# Patient Record
Sex: FEMALE | Race: WHITE
[De-identification: names, ages, dates, MRNs, and addresses within clinical notes are randomized per-mention and may not be internally consistent; named-entity substitution may affect disease eponyms.]

---

## 2017-07-31 ENCOUNTER — HOSPITAL ENCOUNTER (OUTPATIENT)
Dept: HOSPITAL 53 - M ADAMS | Age: 77
End: 2017-07-31
Attending: NURSE PRACTITIONER
Payer: MEDICARE

## 2017-07-31 DIAGNOSIS — M25.551: ICD-10-CM

## 2017-07-31 DIAGNOSIS — E03.9: ICD-10-CM

## 2017-07-31 DIAGNOSIS — M45.5: Primary | ICD-10-CM

## 2017-07-31 LAB — T4 FREE SERPL-MCNC: 1.54 NG/DL (ref 0.76–1.46)

## 2017-07-31 PROCEDURE — 86617 LYME DISEASE ANTIBODY: CPT

## 2017-07-31 PROCEDURE — 86200 CCP ANTIBODY: CPT

## 2017-07-31 PROCEDURE — 73502 X-RAY EXAM HIP UNI 2-3 VIEWS: CPT

## 2017-07-31 PROCEDURE — 36415 COLL VENOUS BLD VENIPUNCTURE: CPT

## 2017-07-31 PROCEDURE — 84443 ASSAY THYROID STIM HORMONE: CPT

## 2017-07-31 PROCEDURE — 86431 RHEUMATOID FACTOR QUANT: CPT

## 2017-07-31 PROCEDURE — 72110 X-RAY EXAM L-2 SPINE 4/>VWS: CPT

## 2017-07-31 PROCEDURE — 84439 ASSAY OF FREE THYROXINE: CPT

## 2017-07-31 NOTE — REP
RIGHT HIP, TWO VIEWS:

 

HISTORY: Pain.

 

There is no acute fracture or dislocation. There is narrowing of the joint

space.

 

IMPRESSION:

 

Degenerative change as described above.

 

 

Signed by

Terry Reyes MD 07/31/2017 04:47 P

## 2017-07-31 NOTE — REP
LUMBAR SPINE, SEVEN VIEWS:

 

HISTORY: Back pain.

 

There is no acute fracture. The intervertebral discs are decreased in height.

Vacuum phenomenon is present at the L3-4 and L4-5 levels. These findings are

consistent with disc degeneration. Osteophytes are present throughout the

lumbar spine. There is narrowing of the facet joints. There is scoliosis convex

to the right.

 

IMPRESSION:

 

Degenerative change as described above.

 

 

Signed by

Terry Reyes MD 07/31/2017 04:44 P

## 2017-08-01 ENCOUNTER — HOSPITAL ENCOUNTER (OUTPATIENT)
Dept: HOSPITAL 53 - M LABDRWAD | Age: 77
End: 2017-08-01
Attending: NURSE PRACTITIONER
Payer: MEDICARE

## 2017-08-01 DIAGNOSIS — M25.50: Primary | ICD-10-CM

## 2017-08-03 LAB
B BURGDOR IGG+IGM SER-ACNC: <0.91 ISR (ref 0–0.9)
B BURGDOR IGM SER IA-ACNC: <0.8 INDEX (ref 0–0.79)

## 2017-11-16 ENCOUNTER — HOSPITAL ENCOUNTER (OUTPATIENT)
Dept: HOSPITAL 53 - M SFHCADAM | Age: 77
End: 2017-11-16
Attending: INTERNAL MEDICINE
Payer: MEDICARE

## 2017-11-16 DIAGNOSIS — E78.00: ICD-10-CM

## 2017-11-16 DIAGNOSIS — I10: Primary | ICD-10-CM

## 2017-11-16 LAB
ALBUMIN SERPL BCG-MCNC: 3.7 GM/DL (ref 3.2–5.2)
ALBUMIN/GLOB SERPL: 1.23 {RATIO} (ref 1–1.93)
ALP SERPL-CCNC: 64 U/L (ref 45–117)
ALT SERPL W P-5'-P-CCNC: 26 U/L (ref 12–78)
ANION GAP SERPL CALC-SCNC: 5 MEQ/L (ref 8–16)
AST SERPL-CCNC: 23 U/L (ref 7–37)
BILIRUB SERPL-MCNC: 1.5 MG/DL (ref 0.2–1)
BUN SERPL-MCNC: 15 MG/DL (ref 7–18)
CALCIUM SERPL-MCNC: 9.8 MG/DL (ref 8.8–10.2)
CHLORIDE SERPL-SCNC: 102 MEQ/L (ref 98–107)
CHOLEST SERPL-MCNC: 279 MG/DL (ref ?–200)
CO2 SERPL-SCNC: 34 MEQ/L (ref 21–32)
CREAT SERPL-MCNC: 1.04 MG/DL (ref 0.55–1.02)
GFR SERPL CREATININE-BSD FRML MDRD: 54.8 ML/MIN/{1.73_M2} (ref 39–?)
GLUCOSE SERPL-MCNC: 91 MG/DL (ref 83–110)
POTASSIUM SERPL-SCNC: 4.8 MEQ/L (ref 3.5–5.1)
PROT SERPL-MCNC: 6.7 GM/DL (ref 6.4–8.2)
SODIUM SERPL-SCNC: 141 MEQ/L (ref 136–145)
TRIGL SERPL-MCNC: 99 MG/DL (ref ?–150)

## 2018-01-19 ENCOUNTER — HOSPITAL ENCOUNTER (OUTPATIENT)
Dept: HOSPITAL 53 - M LAB REF | Age: 78
End: 2018-01-19
Attending: PHYSICIAN ASSISTANT
Payer: MEDICARE

## 2018-01-19 DIAGNOSIS — M19.90: Primary | ICD-10-CM

## 2018-01-19 LAB
ALBUMIN: 4.3 GM/DL (ref 3.2–5.2)
ALKALINE PHOSPHATASE: 76 U/L (ref 45–117)
ALT SERPL W P-5'-P-CCNC: 27 U/L (ref 12–78)
AST SERPL-CCNC: 32 U/L (ref 7–37)
BASO #: 0 10^3/UL (ref 0–0.2)
BASO %: 0.3 % (ref 0–1)
CREATININE FOR GFR: 0.99 MG/DL (ref 0.55–1.02)
CRP SERPL-MCNC: < 0.3 MG/DL (ref 0–0.3)
EOS #: 0 10^3/UL (ref 0–0.5)
EOSINOPHIL NFR BLD AUTO: 0.1 % (ref 0–3)
ERYTHROCYTE SEDIMENTATION RATE: 4 MM/HR (ref 0–30)
GFR SERPL CREATININE-BSD FRML MDRD: 58.1 ML/MIN/{1.73_M2} (ref 39–?)
HEMATOCRIT: 45.4 % (ref 36–47)
HEMOGLOBIN: 15 G/DL (ref 12–16)
IMMATURE GRANULOCYTE #: 0.1 10^3/UL (ref 0–0)
IMMATURE GRANULOCYTE %: 0.4 % (ref 0–0)
LYMPH #: 1.1 10^3/UL (ref 1.5–4.5)
LYMPH %: 8.8 % (ref 24–44)
MEAN CORPUSCULAR HEMOGLOBIN: 30.9 PG (ref 27–33)
MEAN CORPUSCULAR HGB CONC: 33 G/DL (ref 32–36.5)
MEAN CORPUSCULAR VOLUME: 93.6 FL (ref 80–96)
MONO #: 0.3 10^3/UL (ref 0–0.8)
MONO %: 2.7 % (ref 0–5)
NEUTROPHILS #: 10.6 10^3/UL (ref 1.8–7.7)
NEUTROPHILS %: 87.7 % (ref 36–66)
NRBC BLD AUTO-RTO: 0 % (ref 0–0)
PLATELET COUNT, AUTOMATED: 309 10^3/UL (ref 150–450)
RED BLOOD COUNT: 4.85 10^6/UL (ref 4–5.4)
RED CELL DISTRIBUTION WIDTH: 12.6 % (ref 11.5–14.5)
WHITE BLOOD COUNT: 12.1 10^3/UL (ref 4–10)

## 2018-01-19 PROCEDURE — 84460 ALANINE AMINO (ALT) (SGPT): CPT

## 2018-03-27 ENCOUNTER — HOSPITAL ENCOUNTER (OUTPATIENT)
Dept: HOSPITAL 53 - M SFHCADAM | Age: 78
End: 2018-03-27
Attending: INTERNAL MEDICINE
Payer: MEDICARE

## 2018-03-27 DIAGNOSIS — E78.00: ICD-10-CM

## 2018-03-27 DIAGNOSIS — Z00.00: Primary | ICD-10-CM

## 2018-03-27 DIAGNOSIS — M85.80: ICD-10-CM

## 2018-03-27 DIAGNOSIS — I10: ICD-10-CM

## 2018-03-27 LAB
25(OH)D3 SERPL-MCNC: 57.5 NG/ML (ref 30–100)
ALBUMIN/GLOBULIN RATIO: 1.1 (ref 1–1.93)
ALBUMIN: 3.4 GM/DL (ref 3.2–5.2)
ALKALINE PHOSPHATASE: 69 U/L (ref 45–117)
ALT SERPL W P-5'-P-CCNC: 21 U/L (ref 12–78)
ANION GAP: 4 MEQ/L (ref 8–16)
AST SERPL-CCNC: 27 U/L (ref 7–37)
BILIRUBIN,TOTAL: 1.4 MG/DL (ref 0.2–1)
BLOOD UREA NITROGEN: 12 MG/DL (ref 7–18)
CALCIUM LEVEL: 8.9 MG/DL (ref 8.8–10.2)
CARBON DIOXIDE LEVEL: 33 MEQ/L (ref 21–32)
CHLORIDE LEVEL: 102 MEQ/L (ref 98–107)
CHOLEST SERPL-MCNC: 219 MG/DL (ref ?–200)
CHOLESTEROL RISK RATIO: 2.1 (ref ?–5)
CREATININE FOR GFR: 0.92 MG/DL (ref 0.55–1.3)
FOLATE SERPL-MCNC: > 24 NG/ML
GFR SERPL CREATININE-BSD FRML MDRD: > 60 ML/MIN/{1.73_M2} (ref 39–?)
GLUCOSE, FASTING: 85 MG/DL (ref 70–100)
HDLC SERPL-MCNC: 104 MG/DL (ref 40–?)
LDL CHOLESTEROL: 98.6 MG/DL (ref ?–100)
NONHDLC SERPL-MCNC: 115 MG/DL
POTASSIUM SERUM: 4.6 MEQ/L (ref 3.5–5.1)
SODIUM LEVEL: 139 MEQ/L (ref 136–145)
TOTAL PROTEIN: 6.5 GM/DL (ref 6.4–8.2)
TRIGLYCERIDES LEVEL: 82 MG/DL (ref ?–150)
VIT B12 SERPL-MCNC: 744 PG/ML

## 2018-03-27 PROCEDURE — 82746 ASSAY OF FOLIC ACID SERUM: CPT

## 2018-04-27 ENCOUNTER — HOSPITAL ENCOUNTER (OUTPATIENT)
Dept: HOSPITAL 53 - M LABDRWAD | Age: 78
End: 2018-04-27
Payer: MEDICARE

## 2018-04-27 DIAGNOSIS — Z79.899: Primary | ICD-10-CM

## 2018-04-27 LAB
ALBUMIN: 4 GM/DL (ref 3.2–5.2)
ALT SERPL W P-5'-P-CCNC: 23 U/L (ref 12–78)
AST SERPL-CCNC: 26 U/L (ref 7–37)
BASO #: 0.1 10^3/UL (ref 0–0.2)
BASO %: 0.8 % (ref 0–1)
CREATININE FOR GFR: 0.98 MG/DL (ref 0.55–1.3)
CRP SERPL-MCNC: < 0.3 MG/DL (ref 0–0.3)
EOS #: 0.1 10^3/UL (ref 0–0.5)
EOSINOPHIL NFR BLD AUTO: 0.8 % (ref 0–3)
ERYTHROCYTE SEDIMENTATION RATE: 51 MM/HR (ref 0–30)
GFR SERPL CREATININE-BSD FRML MDRD: 58.7 ML/MIN/{1.73_M2} (ref 39–?)
HEMATOCRIT: 43.2 % (ref 36–47)
HEMOGLOBIN: 14.2 G/DL (ref 12–15.5)
IMMATURE GRANULOCYTE #: 0 10^3/UL (ref 0–0)
IMMATURE GRANULOCYTE %: 0.3 % (ref 0–3)
LYMPH #: 1.9 10^3/UL (ref 1.5–4.5)
LYMPH %: 15.9 % (ref 24–44)
MEAN CORPUSCULAR HEMOGLOBIN: 31.3 PG (ref 27–33)
MEAN CORPUSCULAR HGB CONC: 32.9 G/DL (ref 32–36.5)
MEAN CORPUSCULAR VOLUME: 95.2 FL (ref 80–96)
MONO #: 0.8 10^3/UL (ref 0–0.8)
MONO %: 7 % (ref 0–5)
NEUTROPHILS #: 9 10^3/UL (ref 1.8–7.7)
NEUTROPHILS %: 75.2 % (ref 36–66)
NRBC BLD AUTO-RTO: 0 % (ref 0–0)
PLATELET COUNT, AUTOMATED: 283 10^3/UL (ref 150–450)
RED BLOOD COUNT: 4.54 10^6/UL (ref 4–5.4)
RED CELL DISTRIBUTION WIDTH: 13.1 % (ref 11.5–14.5)
WHITE BLOOD COUNT: 12 10^3/UL (ref 4–10)

## 2018-04-27 PROCEDURE — 84460 ALANINE AMINO (ALT) (SGPT): CPT

## 2018-06-26 ENCOUNTER — HOSPITAL ENCOUNTER (OUTPATIENT)
Dept: HOSPITAL 53 - M LABDRWAD | Age: 78
End: 2018-06-26
Attending: PHYSICIAN ASSISTANT
Payer: MEDICARE

## 2018-06-26 DIAGNOSIS — M19.90: ICD-10-CM

## 2018-06-26 DIAGNOSIS — Z79.899: ICD-10-CM

## 2018-06-26 DIAGNOSIS — M35.3: Primary | ICD-10-CM

## 2018-06-26 LAB
BASO #: 0.1 10^3/UL (ref 0–0.2)
BASO %: 0.5 % (ref 0–1)
CREATININE FOR GFR: 0.89 MG/DL (ref 0.55–1.3)
CRP SERPL-MCNC: < 0.3 MG/DL (ref 0–0.3)
EOS #: 0.1 10^3/UL (ref 0–0.5)
EOSINOPHIL NFR BLD AUTO: 0.6 % (ref 0–3)
ERYTHROCYTE SEDIMENTATION RATE: 4 MM/HR (ref 0–30)
GFR SERPL CREATININE-BSD FRML MDRD: > 60 ML/MIN/{1.73_M2} (ref 39–?)
HEMATOCRIT: 40 % (ref 36–47)
HEMOGLOBIN: 13.7 G/DL (ref 12–15.5)
IMMATURE GRANULOCYTE #: 0.1 10^3/UL (ref 0–0)
IMMATURE GRANULOCYTE %: 0.4 % (ref 0–3)
LYMPH #: 2.5 10^3/UL (ref 1.5–4.5)
LYMPH %: 21.1 % (ref 24–44)
MEAN CORPUSCULAR HEMOGLOBIN: 31.8 PG (ref 27–33)
MEAN CORPUSCULAR HGB CONC: 34.3 G/DL (ref 32–36.5)
MEAN CORPUSCULAR VOLUME: 92.8 FL (ref 80–96)
MONO #: 0.7 10^3/UL (ref 0–0.8)
MONO %: 6 % (ref 0–5)
NEUTROPHILS #: 8.3 10^3/UL (ref 1.8–7.7)
NEUTROPHILS %: 71.4 % (ref 36–66)
NRBC BLD AUTO-RTO: 0 % (ref 0–0)
PLATELET COUNT, AUTOMATED: 283 10^3/UL (ref 150–450)
RED BLOOD COUNT: 4.31 10^6/UL (ref 4–5.4)
RED CELL DISTRIBUTION WIDTH: 12.5 % (ref 11.5–14.5)
WHITE BLOOD COUNT: 11.6 10^3/UL (ref 4–10)

## 2018-06-26 PROCEDURE — 82565 ASSAY OF CREATININE: CPT

## 2018-10-12 ENCOUNTER — HOSPITAL ENCOUNTER (OUTPATIENT)
Dept: HOSPITAL 53 - M SFHCADAM | Age: 78
End: 2018-10-12
Attending: INTERNAL MEDICINE
Payer: MEDICARE

## 2018-10-12 DIAGNOSIS — E03.9: ICD-10-CM

## 2018-10-12 DIAGNOSIS — I10: Primary | ICD-10-CM

## 2018-10-12 LAB
ALBUMIN/GLOBULIN RATIO: 1.36 (ref 1–1.93)
ALBUMIN: 3.8 GM/DL (ref 3.2–5.2)
ALKALINE PHOSPHATASE: 81 U/L (ref 45–117)
ALT SERPL W P-5'-P-CCNC: 23 U/L (ref 12–78)
ANION GAP: 8 MEQ/L (ref 8–16)
AST SERPL-CCNC: 25 U/L (ref 7–37)
BILIRUBIN,TOTAL: 1.6 MG/DL (ref 0.2–1)
BLOOD UREA NITROGEN: 13 MG/DL (ref 7–18)
CALCIUM LEVEL: 9.6 MG/DL (ref 8.8–10.2)
CARBON DIOXIDE LEVEL: 31 MEQ/L (ref 21–32)
CHLORIDE LEVEL: 100 MEQ/L (ref 98–107)
CREATININE FOR GFR: 0.87 MG/DL (ref 0.55–1.3)
GFR SERPL CREATININE-BSD FRML MDRD: > 60 ML/MIN/{1.73_M2} (ref 39–?)
GLUCOSE, FASTING: 108 MG/DL (ref 70–100)
MAGNESIUM LEVEL: 2.1 MG/DL (ref 1.8–2.4)
POTASSIUM SERUM: 4.5 MEQ/L (ref 3.5–5.1)
SODIUM LEVEL: 139 MEQ/L (ref 136–145)
THYROID STIMULATING HORMONE: 0.71 UIU/ML (ref 0.36–3.74)
TOTAL PROTEIN: 6.6 GM/DL (ref 6.4–8.2)

## 2018-10-12 PROCEDURE — 83735 ASSAY OF MAGNESIUM: CPT

## 2018-11-29 ENCOUNTER — HOSPITAL ENCOUNTER (OUTPATIENT)
Dept: HOSPITAL 53 - M LABDRWAD | Age: 78
End: 2018-11-29
Attending: PHYSICIAN ASSISTANT
Payer: MEDICARE

## 2018-11-29 DIAGNOSIS — M19.90: ICD-10-CM

## 2018-11-29 DIAGNOSIS — Z79.899: ICD-10-CM

## 2018-11-29 DIAGNOSIS — Z51.81: Primary | ICD-10-CM

## 2018-11-29 DIAGNOSIS — M35.3: ICD-10-CM

## 2018-11-29 LAB
BASO #: 0.1 10^3/UL (ref 0–0.2)
BASO %: 0.6 % (ref 0–1)
BLOOD UREA NITROGEN: 17 MG/DL (ref 7–18)
CREATININE FOR GFR: 1.08 MG/DL (ref 0.55–1.3)
CRP SERPL-MCNC: < 0.3 MG/DL (ref 0–0.3)
EOS #: 0.2 10^3/UL (ref 0–0.5)
EOSINOPHIL NFR BLD AUTO: 1.9 % (ref 0–3)
ERYTHROCYTE SEDIMENTATION RATE: 8 MM/HR (ref 0–30)
GFR SERPL CREATININE-BSD FRML MDRD: 52.4 ML/MIN/{1.73_M2} (ref 39–?)
HEMATOCRIT: 43.5 % (ref 36–47)
HEMOGLOBIN: 14.5 G/DL (ref 12–15.5)
IMMATURE GRANULOCYTE #: 0 10^3/UL (ref 0–0)
IMMATURE GRANULOCYTE %: 0.3 % (ref 0–3)
LYMPH #: 3 10^3/UL (ref 1.5–4.5)
LYMPH %: 25.4 % (ref 24–44)
MEAN CORPUSCULAR HEMOGLOBIN: 30.9 PG (ref 27–33)
MEAN CORPUSCULAR HGB CONC: 33.3 G/DL (ref 32–36.5)
MEAN CORPUSCULAR VOLUME: 92.8 FL (ref 80–96)
MONO #: 0.9 10^3/UL (ref 0–0.8)
MONO %: 7.7 % (ref 0–5)
NEUTROPHILS #: 7.6 10^3/UL (ref 1.8–7.7)
NEUTROPHILS %: 64.1 % (ref 36–66)
NRBC BLD AUTO-RTO: 0 % (ref 0–0)
PLATELET COUNT, AUTOMATED: 287 10^3/UL (ref 150–450)
RED BLOOD COUNT: 4.69 10^6/UL (ref 4–5.4)
RED CELL DISTRIBUTION WIDTH: 12.8 % (ref 11.5–14.5)
WHITE BLOOD COUNT: 11.8 10^3/UL (ref 4–10)

## 2018-11-29 PROCEDURE — 82565 ASSAY OF CREATININE: CPT

## 2019-04-05 ENCOUNTER — HOSPITAL ENCOUNTER (OUTPATIENT)
Dept: HOSPITAL 53 - M SFHCADAM | Age: 79
End: 2019-04-05
Attending: INTERNAL MEDICINE
Payer: MEDICARE

## 2019-04-05 DIAGNOSIS — I10: Primary | ICD-10-CM

## 2019-04-05 DIAGNOSIS — E78.00: ICD-10-CM

## 2019-04-05 DIAGNOSIS — M35.3: ICD-10-CM

## 2019-04-05 LAB
ALBUMIN SERPL BCG-MCNC: 3.6 GM/DL (ref 3.2–5.2)
ALT SERPL W P-5'-P-CCNC: 19 U/L (ref 12–78)
BILIRUB SERPL-MCNC: 1.7 MG/DL (ref 0.2–1)
BUN SERPL-MCNC: 8 MG/DL (ref 7–18)
CALCIUM SERPL-MCNC: 9.1 MG/DL (ref 8.8–10.2)
CHLORIDE SERPL-SCNC: 103 MEQ/L (ref 98–107)
CHOLEST SERPL-MCNC: 204 MG/DL (ref ?–200)
CHOLEST/HDLC SERPL: 2.58 {RATIO} (ref ?–5)
CO2 SERPL-SCNC: 32 MEQ/L (ref 21–32)
CREAT SERPL-MCNC: 0.82 MG/DL (ref 0.55–1.3)
CRP SERPL-MCNC: 0.61 MG/DL (ref 0–0.3)
GFR SERPL CREATININE-BSD FRML MDRD: > 60 ML/MIN/{1.73_M2} (ref 39–?)
GLUCOSE SERPL-MCNC: 88 MG/DL (ref 70–100)
HDLC SERPL-MCNC: 79 MG/DL (ref 40–?)
LDLC SERPL CALC-MCNC: 105 MG/DL (ref ?–100)
MAGNESIUM SERPL-MCNC: 1.9 MG/DL (ref 1.8–2.4)
NONHDLC SERPL-MCNC: 125 MG/DL
POTASSIUM SERPL-SCNC: 4.7 MEQ/L (ref 3.5–5.1)
PROT SERPL-MCNC: 6.6 GM/DL (ref 6.4–8.2)
SODIUM SERPL-SCNC: 139 MEQ/L (ref 136–145)
TRIGL SERPL-MCNC: 99 MG/DL (ref ?–150)

## 2020-04-23 ENCOUNTER — HOSPITAL ENCOUNTER (OUTPATIENT)
Dept: HOSPITAL 53 - M SFHCADAM | Age: 80
End: 2020-04-23
Attending: INTERNAL MEDICINE
Payer: MEDICARE

## 2020-04-23 DIAGNOSIS — E78.00: ICD-10-CM

## 2020-04-23 DIAGNOSIS — I73.00: Primary | ICD-10-CM

## 2020-04-23 DIAGNOSIS — I10: ICD-10-CM

## 2020-04-23 DIAGNOSIS — M85.80: ICD-10-CM

## 2020-04-23 LAB
25(OH)D3 SERPL-MCNC: 80.9 NG/ML (ref 30–100)
ALBUMIN SERPL BCG-MCNC: 3.6 GM/DL (ref 3.2–5.2)
ALT SERPL W P-5'-P-CCNC: 25 U/L (ref 12–78)
BILIRUB SERPL-MCNC: 1.2 MG/DL (ref 0.2–1)
BUN SERPL-MCNC: 19 MG/DL (ref 7–18)
CALCIUM SERPL-MCNC: 10.1 MG/DL (ref 8.8–10.2)
CHLORIDE SERPL-SCNC: 104 MEQ/L (ref 98–107)
CHOLEST SERPL-MCNC: 235 MG/DL (ref ?–200)
CHOLEST/HDLC SERPL: 2.87 {RATIO} (ref ?–5)
CO2 SERPL-SCNC: 30 MEQ/L (ref 21–32)
CREAT SERPL-MCNC: 0.89 MG/DL (ref 0.55–1.3)
GFR SERPL CREATININE-BSD FRML MDRD: > 60 ML/MIN/{1.73_M2} (ref 39–?)
GLUCOSE SERPL-MCNC: 97 MG/DL (ref 70–100)
HCT VFR BLD AUTO: 44.7 % (ref 36–47)
HDLC SERPL-MCNC: 82 MG/DL (ref 40–?)
HGB BLD-MCNC: 14.5 G/DL (ref 12–15.5)
LDLC SERPL CALC-MCNC: 127 MG/DL (ref ?–100)
MAGNESIUM SERPL-MCNC: 2.2 MG/DL (ref 1.8–2.4)
MCH RBC QN AUTO: 29.9 PG (ref 27–33)
MCHC RBC AUTO-ENTMCNC: 32.4 G/DL (ref 32–36.5)
MCV RBC AUTO: 92.2 FL (ref 80–96)
NONHDLC SERPL-MCNC: 153 MG/DL
PLATELET # BLD AUTO: 303 10^3/UL (ref 150–450)
POTASSIUM SERPL-SCNC: 5.2 MEQ/L (ref 3.5–5.1)
PROT SERPL-MCNC: 7.1 GM/DL (ref 6.4–8.2)
RBC # BLD AUTO: 4.85 10^6/UL (ref 4–5.4)
SODIUM SERPL-SCNC: 139 MEQ/L (ref 136–145)
TRIGL SERPL-MCNC: 130 MG/DL (ref ?–150)
WBC # BLD AUTO: 7.4 10^3/UL (ref 4–10)

## 2021-04-13 ENCOUNTER — HOSPITAL ENCOUNTER (OUTPATIENT)
Dept: HOSPITAL 53 - M SFHCADAM | Age: 81
End: 2021-04-13
Attending: INTERNAL MEDICINE
Payer: MEDICARE

## 2021-04-13 DIAGNOSIS — E03.9: Primary | ICD-10-CM

## 2021-04-13 DIAGNOSIS — M85.80: ICD-10-CM

## 2021-04-13 DIAGNOSIS — M35.3: ICD-10-CM

## 2021-04-13 DIAGNOSIS — E78.00: ICD-10-CM

## 2021-04-13 DIAGNOSIS — I10: ICD-10-CM

## 2021-04-13 LAB
25(OH)D3 SERPL-MCNC: 58.9 NG/ML (ref 30–100)
ALBUMIN SERPL BCG-MCNC: 3.5 GM/DL (ref 3.2–5.2)
ALT SERPL W P-5'-P-CCNC: 20 U/L (ref 12–78)
BILIRUB SERPL-MCNC: 1.5 MG/DL (ref 0.2–1)
BUN SERPL-MCNC: 10 MG/DL (ref 7–18)
CALCIUM SERPL-MCNC: 10.5 MG/DL (ref 8.8–10.2)
CHLORIDE SERPL-SCNC: 99 MEQ/L (ref 98–107)
CHOLEST SERPL-MCNC: 240 MG/DL (ref ?–200)
CHOLEST/HDLC SERPL: 2.67 {RATIO} (ref ?–5)
CO2 SERPL-SCNC: 32 MEQ/L (ref 21–32)
CREAT SERPL-MCNC: 0.73 MG/DL (ref 0.55–1.3)
CRP SERPL-MCNC: 0.3 MG/DL (ref 0–0.3)
ERYTHROCYTE [SEDIMENTATION RATE] IN BLOOD BY WESTERGREN METHOD: 10 MM/HR (ref 0–30)
GFR SERPL CREATININE-BSD FRML MDRD: > 60 ML/MIN/{1.73_M2} (ref 39–?)
GLUCOSE SERPL-MCNC: 101 MG/DL (ref 70–100)
HCT VFR BLD AUTO: 43.2 % (ref 36–47)
HDLC SERPL-MCNC: 90 MG/DL (ref 40–?)
HGB BLD-MCNC: 14.3 G/DL (ref 12–15.5)
LDLC SERPL CALC-MCNC: 132 MG/DL (ref ?–100)
MAGNESIUM SERPL-MCNC: 2.2 MG/DL (ref 1.8–2.4)
MCH RBC QN AUTO: 29.7 PG (ref 27–33)
MCHC RBC AUTO-ENTMCNC: 33.1 G/DL (ref 32–36.5)
MCV RBC AUTO: 89.8 FL (ref 80–96)
NONHDLC SERPL-MCNC: 150 MG/DL
PLATELET # BLD AUTO: 329 10^3/UL (ref 150–450)
POTASSIUM SERPL-SCNC: 4.4 MEQ/L (ref 3.5–5.1)
PROT SERPL-MCNC: 6.6 GM/DL (ref 6.4–8.2)
RBC # BLD AUTO: 4.81 10^6/UL (ref 4–5.4)
SODIUM SERPL-SCNC: 135 MEQ/L (ref 136–145)
TRIGL SERPL-MCNC: 89 MG/DL (ref ?–150)
WBC # BLD AUTO: 7.6 10^3/UL (ref 4–10)

## 2021-06-08 ENCOUNTER — HOSPITAL ENCOUNTER (OUTPATIENT)
Dept: HOSPITAL 53 - M LAB REF | Age: 81
End: 2021-06-08
Attending: DERMATOLOGY
Payer: MEDICARE

## 2021-06-08 DIAGNOSIS — L90.5: Primary | ICD-10-CM

## 2021-07-06 ENCOUNTER — HOSPITAL ENCOUNTER (OUTPATIENT)
Dept: HOSPITAL 53 - M LABDRWAD | Age: 81
End: 2021-07-06
Attending: PHYSICIAN ASSISTANT
Payer: MEDICARE

## 2021-07-06 DIAGNOSIS — E89.0: ICD-10-CM

## 2021-07-06 DIAGNOSIS — E55.9: Primary | ICD-10-CM

## 2021-07-06 LAB
25(OH)D3 SERPL-MCNC: 65.5 NG/ML (ref 30–100)
T4 FREE SERPL-MCNC: 0.92 NG/DL (ref 0.76–1.46)
TSH SERPL DL<=0.005 MIU/L-ACNC: 0.41 UIU/ML (ref 0.36–3.74)

## 2022-04-22 ENCOUNTER — HOSPITAL ENCOUNTER (OUTPATIENT)
Dept: HOSPITAL 53 - M ADAMS | Age: 82
End: 2022-04-22
Attending: INTERNAL MEDICINE
Payer: MEDICARE

## 2022-04-22 DIAGNOSIS — E03.9: ICD-10-CM

## 2022-04-22 DIAGNOSIS — M35.3: ICD-10-CM

## 2022-04-22 DIAGNOSIS — E78.00: ICD-10-CM

## 2022-04-22 DIAGNOSIS — Z11.59: ICD-10-CM

## 2022-04-22 DIAGNOSIS — I10: Primary | ICD-10-CM

## 2022-04-22 LAB
ALBUMIN SERPL BCG-MCNC: 3.5 GM/DL (ref 3.2–5.2)
ALT SERPL W P-5'-P-CCNC: 21 U/L (ref 12–78)
BILIRUB SERPL-MCNC: 1.6 MG/DL (ref 0.2–1)
BUN SERPL-MCNC: 13 MG/DL (ref 7–18)
CALCIUM SERPL-MCNC: 9.7 MG/DL (ref 8.8–10.2)
CHLORIDE SERPL-SCNC: 102 MEQ/L (ref 98–107)
CHOLEST SERPL-MCNC: 210 MG/DL (ref ?–200)
CHOLEST/HDLC SERPL: 2.33 {RATIO} (ref ?–5)
CO2 SERPL-SCNC: 32 MEQ/L (ref 21–32)
CREAT SERPL-MCNC: 0.86 MG/DL (ref 0.55–1.3)
CRP SERPL-MCNC: 0.3 MG/DL (ref 0–0.3)
ERYTHROCYTE [SEDIMENTATION RATE] IN BLOOD BY WESTERGREN METHOD: 9 MM/HR (ref 0–30)
GFR SERPL CREATININE-BSD FRML MDRD: > 60 ML/MIN/{1.73_M2} (ref 32–?)
GLUCOSE SERPL-MCNC: 95 MG/DL (ref 70–100)
HCT VFR BLD AUTO: 43 % (ref 36–47)
HDLC SERPL-MCNC: 90 MG/DL (ref 40–?)
HGB BLD-MCNC: 14.4 G/DL (ref 12–15.5)
LDLC SERPL CALC-MCNC: 100 MG/DL (ref ?–100)
MCH RBC QN AUTO: 31.2 PG (ref 27–33)
MCHC RBC AUTO-ENTMCNC: 33.5 G/DL (ref 32–36.5)
MCV RBC AUTO: 93.1 FL (ref 80–96)
NONHDLC SERPL-MCNC: 120 MG/DL
PLATELET # BLD AUTO: 316 10^3/UL (ref 150–450)
POTASSIUM SERPL-SCNC: 4.4 MEQ/L (ref 3.5–5.1)
PROT SERPL-MCNC: 6.6 GM/DL (ref 6.4–8.2)
RBC # BLD AUTO: 4.62 10^6/UL (ref 4–5.4)
SODIUM SERPL-SCNC: 138 MEQ/L (ref 136–145)
TRIGL SERPL-MCNC: 101 MG/DL (ref ?–150)
TSH SERPL DL<=0.005 MIU/L-ACNC: 0.71 UIU/ML (ref 0.36–3.74)
WBC # BLD AUTO: 7.6 10^3/UL (ref 4–10)

## 2022-04-22 PROCEDURE — 86140 C-REACTIVE PROTEIN: CPT

## 2022-04-22 PROCEDURE — 80061 LIPID PANEL: CPT

## 2022-04-22 PROCEDURE — 84443 ASSAY THYROID STIM HORMONE: CPT

## 2022-04-22 PROCEDURE — 80053 COMPREHEN METABOLIC PANEL: CPT

## 2022-04-22 PROCEDURE — 85027 COMPLETE CBC AUTOMATED: CPT

## 2022-04-22 PROCEDURE — 85652 RBC SED RATE AUTOMATED: CPT

## 2022-04-22 PROCEDURE — 36415 COLL VENOUS BLD VENIPUNCTURE: CPT

## 2023-04-25 ENCOUNTER — HOSPITAL ENCOUNTER (OUTPATIENT)
Dept: HOSPITAL 53 - M SFHCADAM | Age: 83
End: 2023-04-25
Attending: FAMILY MEDICINE
Payer: MEDICARE

## 2023-04-25 DIAGNOSIS — Z00.00: Primary | ICD-10-CM

## 2023-04-25 DIAGNOSIS — E78.00: ICD-10-CM

## 2023-04-25 LAB
ALBUMIN SERPL BCG-MCNC: 3.6 G/DL (ref 3.2–5.2)
ALP SERPL-CCNC: 93 U/L (ref 46–116)
ALT SERPL W P-5'-P-CCNC: 18 U/L (ref 7–40)
AST SERPL-CCNC: 28 U/L (ref ?–34)
BASOPHILS # BLD AUTO: 0.1 10^3/UL (ref 0–0.2)
BASOPHILS NFR BLD AUTO: 1.1 % (ref 0–1)
BILIRUB SERPL-MCNC: 1.4 MG/DL (ref 0.3–1.2)
BUN SERPL-MCNC: 17 MG/DL (ref 9–23)
CALCIUM SERPL-MCNC: 9.7 MG/DL (ref 8.3–10.6)
CHLORIDE SERPL-SCNC: 98 MMOL/L (ref 98–107)
CHOLEST SERPL-MCNC: 258 MG/DL (ref ?–200)
CHOLEST/HDLC SERPL: 2.86 {RATIO} (ref ?–5)
CO2 SERPL-SCNC: 32 MMOL/L (ref 20–31)
CREAT SERPL-MCNC: 0.87 MG/DL (ref 0.55–1.3)
EOSINOPHIL # BLD AUTO: 0.2 10^3/UL (ref 0–0.5)
EOSINOPHIL NFR BLD AUTO: 2.8 % (ref 0–3)
GFR SERPL CREATININE-BSD FRML MDRD: > 60 ML/MIN/{1.73_M2} (ref 32–?)
GLUCOSE SERPL-MCNC: 100 MG/DL (ref 74–106)
HCT VFR BLD AUTO: 45.3 % (ref 36–47)
HDLC SERPL-MCNC: 90.1 MG/DL (ref 40–?)
HGB BLD-MCNC: 14.9 G/DL (ref 12–15.5)
LDLC SERPL CALC-MCNC: 145.5 MG/DL (ref ?–100)
LYMPHOCYTES # BLD AUTO: 2.8 10^3/UL (ref 1.5–5)
LYMPHOCYTES NFR BLD AUTO: 34.2 % (ref 24–44)
MCH RBC QN AUTO: 30.6 PG (ref 27–33)
MCHC RBC AUTO-ENTMCNC: 32.9 G/DL (ref 32–36.5)
MCV RBC AUTO: 93 FL (ref 80–96)
MONOCYTES # BLD AUTO: 0.7 10^3/UL (ref 0–0.8)
MONOCYTES NFR BLD AUTO: 8.7 % (ref 2–8)
NEUTROPHILS # BLD AUTO: 4.3 10^3/UL (ref 1.5–8.5)
NEUTROPHILS NFR BLD AUTO: 53 % (ref 36–66)
NONHDLC SERPL-MCNC: 167.9 MG/DL
PLATELET # BLD AUTO: 328 10^3/UL (ref 150–450)
POTASSIUM SERPL-SCNC: 4.6 MMOL/L (ref 3.5–5.1)
PROT SERPL-MCNC: 7 G/DL (ref 5.7–8.2)
RBC # BLD AUTO: 4.87 10^6/UL (ref 4–5.4)
SODIUM SERPL-SCNC: 135 MMOL/L (ref 136–145)
TRIGL SERPL-MCNC: 112 MG/DL (ref ?–150)
TSH SERPL DL<=0.005 MIU/L-ACNC: 1.12 UIU/ML (ref 0.55–4.78)
WBC # BLD AUTO: 8.1 10^3/UL (ref 4–10)

## 2023-07-05 ENCOUNTER — HOSPITAL ENCOUNTER (OUTPATIENT)
Dept: HOSPITAL 53 - M LABDRWAD | Age: 83
End: 2023-07-05
Attending: PHYSICIAN ASSISTANT
Payer: MEDICARE

## 2023-07-05 DIAGNOSIS — E55.9: ICD-10-CM

## 2023-07-05 DIAGNOSIS — E89.0: Primary | ICD-10-CM

## 2023-07-05 DIAGNOSIS — Z79.899: ICD-10-CM

## 2023-07-05 LAB
25(OH)D3 SERPL-MCNC: 55.8 NG/ML (ref 20–100)
T4 FREE SERPL-MCNC: 0.99 NG/DL (ref 0.89–1.76)
TSH SERPL DL<=0.005 MIU/L-ACNC: 1.15 UIU/ML (ref 0.55–4.78)

## 2023-09-07 ENCOUNTER — HOSPITAL ENCOUNTER (OUTPATIENT)
Dept: HOSPITAL 53 - M ADAMS | Age: 83
End: 2023-09-07
Attending: PHYSICIAN ASSISTANT
Payer: MEDICARE

## 2023-09-07 DIAGNOSIS — M25.561: Primary | ICD-10-CM

## 2024-01-25 ENCOUNTER — HOSPITAL ENCOUNTER (OUTPATIENT)
Dept: HOSPITAL 53 - M SFHCADAM | Age: 84
End: 2024-01-25
Attending: PHYSICIAN ASSISTANT
Payer: MEDICARE

## 2024-01-25 DIAGNOSIS — E80.4: ICD-10-CM

## 2024-01-25 DIAGNOSIS — I10: Primary | ICD-10-CM

## 2024-01-25 DIAGNOSIS — E78.00: ICD-10-CM

## 2024-01-25 LAB
ALBUMIN SERPL BCG-MCNC: 3.6 G/DL (ref 3.2–5.2)
ALP SERPL-CCNC: 86 U/L (ref 46–116)
ALT SERPL W P-5'-P-CCNC: 19 U/L (ref 7–40)
AST SERPL-CCNC: 27 U/L (ref ?–34)
BASOPHILS # BLD AUTO: 0.1 10^3/UL (ref 0–0.2)
BASOPHILS NFR BLD AUTO: 1.1 % (ref 0–1)
BILIRUB SERPL-MCNC: 1.7 MG/DL (ref 0.3–1.2)
BUN SERPL-MCNC: 13 MG/DL (ref 9–23)
CALCIUM SERPL-MCNC: 9.7 MG/DL (ref 8.3–10.6)
CHLORIDE SERPL-SCNC: 100 MMOL/L (ref 98–107)
CHOLEST SERPL-MCNC: 240 MG/DL (ref ?–200)
CHOLEST/HDLC SERPL: 2.8 {RATIO} (ref ?–5)
CO2 SERPL-SCNC: 34 MMOL/L (ref 20–31)
CREAT SERPL-MCNC: 0.8 MG/DL (ref 0.55–1.3)
EOSINOPHIL # BLD AUTO: 0.3 10^3/UL (ref 0–0.5)
EOSINOPHIL NFR BLD AUTO: 3.1 % (ref 0–3)
GFR SERPL CREATININE-BSD FRML MDRD: > 60 ML/MIN/{1.73_M2} (ref 32–?)
GLUCOSE SERPL-MCNC: 97 MG/DL (ref 74–106)
HCT VFR BLD AUTO: 44.8 % (ref 36–47)
HDLC SERPL-MCNC: 85.6 MG/DL (ref 40–?)
HGB BLD-MCNC: 14.8 G/DL (ref 12–15.5)
LDLC SERPL CALC-MCNC: 131.8 MG/DL (ref ?–100)
LYMPHOCYTES # BLD AUTO: 2.9 10^3/UL (ref 1.5–5)
LYMPHOCYTES NFR BLD AUTO: 36.1 % (ref 24–44)
MCH RBC QN AUTO: 30.7 PG (ref 27–33)
MCHC RBC AUTO-ENTMCNC: 33 G/DL (ref 32–36.5)
MCV RBC AUTO: 92.9 FL (ref 80–96)
MONOCYTES # BLD AUTO: 0.8 10^3/UL (ref 0–0.8)
MONOCYTES NFR BLD AUTO: 9.7 % (ref 2–8)
NEUTROPHILS # BLD AUTO: 4.1 10^3/UL (ref 1.5–8.5)
NEUTROPHILS NFR BLD AUTO: 49.8 % (ref 36–66)
NONHDLC SERPL-MCNC: 154.4 MG/DL
PLATELET # BLD AUTO: 321 10^3/UL (ref 150–450)
POTASSIUM SERPL-SCNC: 4.1 MMOL/L (ref 3.5–5.1)
PROT SERPL-MCNC: 6.7 G/DL (ref 5.7–8.2)
RBC # BLD AUTO: 4.82 10^6/UL (ref 4–5.4)
SODIUM SERPL-SCNC: 137 MMOL/L (ref 136–145)
TRIGL SERPL-MCNC: 113 MG/DL (ref ?–150)
TSH SERPL DL<=0.005 MIU/L-ACNC: 1.29 UIU/ML (ref 0.55–4.78)
WBC # BLD AUTO: 8.1 10^3/UL (ref 4–10)

## 2024-02-19 ENCOUNTER — HOSPITAL ENCOUNTER (OUTPATIENT)
Dept: HOSPITAL 53 - M SFHCDERM | Age: 84
End: 2024-02-19
Attending: PHYSICIAN ASSISTANT
Payer: MEDICARE

## 2024-02-19 DIAGNOSIS — L57.0: Primary | ICD-10-CM

## 2024-07-24 ENCOUNTER — HOSPITAL ENCOUNTER (OUTPATIENT)
Dept: HOSPITAL 53 - M SFHCADAM | Age: 84
End: 2024-07-24
Attending: PHYSICIAN ASSISTANT
Payer: MEDICARE

## 2024-07-24 DIAGNOSIS — J06.9: Primary | ICD-10-CM

## 2024-07-25 ENCOUNTER — HOSPITAL ENCOUNTER (OUTPATIENT)
Dept: HOSPITAL 53 - M ADAMS | Age: 84
End: 2024-07-25
Attending: PHYSICIAN ASSISTANT
Payer: MEDICARE

## 2024-07-25 ENCOUNTER — HOSPITAL ENCOUNTER (OUTPATIENT)
Dept: HOSPITAL 53 - M SFHCADAM | Age: 84
End: 2024-07-25
Attending: PHYSICIAN ASSISTANT
Payer: MEDICARE

## 2024-07-25 DIAGNOSIS — R53.83: Primary | ICD-10-CM

## 2024-07-25 DIAGNOSIS — E55.9: ICD-10-CM

## 2024-07-25 DIAGNOSIS — M35.3: ICD-10-CM

## 2024-07-25 DIAGNOSIS — R53.83: ICD-10-CM

## 2024-07-25 DIAGNOSIS — E03.9: Primary | ICD-10-CM

## 2024-07-25 DIAGNOSIS — R05.1: ICD-10-CM

## 2024-07-25 LAB
25(OH)D3 SERPL-MCNC: 58.4 NG/ML (ref 20–100)
BUN SERPL-MCNC: 14 MG/DL (ref 9–23)
CALCIUM SERPL-MCNC: 9.5 MG/DL (ref 8.3–10.6)
CHLORIDE SERPL-SCNC: 99 MMOL/L (ref 98–107)
CO2 SERPL-SCNC: 29 MMOL/L (ref 20–31)
CREAT SERPL-MCNC: 0.8 MG/DL (ref 0.55–1.3)
CRP SERPL-MCNC: < 0.4 MG/DL (ref ?–1)
GFR SERPL CREATININE-BSD FRML MDRD: > 60 ML/MIN/{1.73_M2} (ref 32–?)
GLUCOSE SERPL-MCNC: 120 MG/DL (ref 74–106)
MAGNESIUM SERPL-MCNC: 1.9 MG/DL (ref 1.8–2.4)
POTASSIUM SERPL-SCNC: 4.4 MMOL/L (ref 3.5–5.1)
SODIUM SERPL-SCNC: 133 MMOL/L (ref 136–145)
T4 FREE SERPL-MCNC: 1.19 NG/DL (ref 0.89–1.76)
TSH SERPL DL<=0.005 MIU/L-ACNC: 0.96 UIU/ML (ref 0.55–4.78)

## 2024-09-05 ENCOUNTER — HOSPITAL ENCOUNTER (OUTPATIENT)
Dept: HOSPITAL 53 - M SFHCADAM | Age: 84
End: 2024-09-05
Attending: PHYSICIAN ASSISTANT
Payer: MEDICARE

## 2024-09-05 DIAGNOSIS — M54.16: Primary | ICD-10-CM

## 2024-09-10 ENCOUNTER — HOSPITAL ENCOUNTER (OUTPATIENT)
Dept: HOSPITAL 53 - M LABDRWAD | Age: 84
End: 2024-09-10
Attending: STUDENT IN AN ORGANIZED HEALTH CARE EDUCATION/TRAINING PROGRAM
Payer: MEDICARE

## 2024-09-10 DIAGNOSIS — J84.9: Primary | ICD-10-CM

## 2024-09-10 LAB
CRP SERPL-MCNC: < 0.4 MG/DL (ref ?–1)
RHEUMATOID FACT SERPL-ACNC: 79.5 IU/ML (ref ?–14)

## 2024-09-11 LAB — B BURGDOR IGG+IGM SER QL IA: <= 0.9 INDEX (ref ?–0.9)

## 2024-09-16 ENCOUNTER — HOSPITAL ENCOUNTER (OUTPATIENT)
Dept: HOSPITAL 53 - M LABDRWAD | Age: 84
End: 2024-09-16
Attending: PSYCHIATRY & NEUROLOGY
Payer: MEDICARE

## 2024-09-16 DIAGNOSIS — E07.9: Primary | ICD-10-CM

## 2024-09-16 DIAGNOSIS — D53.8: ICD-10-CM

## 2024-09-16 DIAGNOSIS — D11.9: ICD-10-CM

## 2024-09-16 LAB
ALBUMIN SERPL BCG-MCNC: 4 G/DL (ref 3.2–5.2)
ALP SERPL-CCNC: 101 U/L (ref 46–116)
ALT SERPL W P-5'-P-CCNC: 23 U/L (ref 7–40)
AST SERPL-CCNC: 29 U/L (ref ?–34)
BASOPHILS # BLD AUTO: 0.1 10^3/UL (ref 0–0.2)
BASOPHILS NFR BLD AUTO: 1 % (ref 0–1)
BILIRUB SERPL-MCNC: 1 MG/DL (ref 0.3–1.2)
BUN SERPL-MCNC: 16 MG/DL (ref 9–23)
CALCIUM SERPL-MCNC: 9.8 MG/DL (ref 8.3–10.6)
CHLORIDE SERPL-SCNC: 99 MMOL/L (ref 98–107)
CK SERPL-CCNC: 96 U/L (ref 34–145)
CO2 SERPL-SCNC: 32 MMOL/L (ref 20–31)
CREAT SERPL-MCNC: 0.73 MG/DL (ref 0.55–1.3)
EOSINOPHIL # BLD AUTO: 0.2 10^3/UL (ref 0–0.5)
EOSINOPHIL NFR BLD AUTO: 1.7 % (ref 0–3)
ERYTHROCYTE [SEDIMENTATION RATE] IN BLOOD BY WESTERGREN METHOD: 32 MM/HR (ref 0–30)
EST. AVERAGE GLUCOSE BLD GHB EST-MCNC: 97 MG/DL (ref 60–110)
FOLATE SERPL-MCNC: > 24 NG/ML (ref 5.4–?)
FT4I SERPL CALC-MCNC: 2.7 % (ref 1.3–4.8)
GFR SERPL CREATININE-BSD FRML MDRD: > 60 ML/MIN/{1.73_M2} (ref 32–?)
GLUCOSE SERPL-MCNC: 78 MG/DL (ref 74–106)
HCT VFR BLD AUTO: 43.1 % (ref 36–47)
HGB BLD-MCNC: 14.2 G/DL (ref 12–15.5)
LYMPHOCYTES # BLD AUTO: 1.7 10^3/UL (ref 1.5–5)
LYMPHOCYTES NFR BLD AUTO: 16.3 % (ref 24–44)
MCH RBC QN AUTO: 31 PG (ref 27–33)
MCHC RBC AUTO-ENTMCNC: 32.9 G/DL (ref 32–36.5)
MCV RBC AUTO: 94.1 FL (ref 80–96)
MONOCYTES # BLD AUTO: 0.9 10^3/UL (ref 0–0.8)
MONOCYTES NFR BLD AUTO: 8.8 % (ref 2–8)
NEUTROPHILS # BLD AUTO: 7.5 10^3/UL (ref 1.5–8.5)
NEUTROPHILS NFR BLD AUTO: 71.9 % (ref 36–66)
PLATELET # BLD AUTO: 352 10^3/UL (ref 150–450)
POTASSIUM SERPL-SCNC: 4.1 MMOL/L (ref 3.5–5.1)
PROT SERPL-MCNC: 7.1 G/DL (ref 5.7–8.2)
RBC # BLD AUTO: 4.58 10^6/UL (ref 4–5.4)
RHEUMATOID FACT SERPL-ACNC: 75 IU/ML (ref ?–14)
SODIUM SERPL-SCNC: 134 MMOL/L (ref 136–145)
T3RU NFR SERPL: 30.5 % (ref 22.5–37)
T4 SERPL-MCNC: 8.8 UG/DL (ref 4.5–10.9)
TSH SERPL DL<=0.005 MIU/L-ACNC: 0.99 UIU/ML (ref 0.55–4.78)
VIT B12 SERPL-MCNC: 1290 PG/ML (ref 211–911)
WBC # BLD AUTO: 10.4 10^3/UL (ref 4–10)

## 2024-09-19 LAB
ANA SER QL IF: POSITIVE
ANA TITR SER IF: (no result) TITER

## 2024-09-20 LAB
A-TOCOPHEROL VIT E SERPL-MCNC: 19.4 MG/L (ref 5.7–19.9)
BETA+GAMMA TOCOPHEROL SERPL-MCNC: < 1 MG/L (ref ?–4.3)

## 2024-09-24 ENCOUNTER — HOSPITAL ENCOUNTER (OUTPATIENT)
Dept: HOSPITAL 53 - M PLAIMG | Age: 84
End: 2024-09-24
Attending: STUDENT IN AN ORGANIZED HEALTH CARE EDUCATION/TRAINING PROGRAM
Payer: MEDICARE

## 2024-09-24 DIAGNOSIS — R91.8: Primary | ICD-10-CM

## 2024-12-02 ENCOUNTER — HOSPITAL ENCOUNTER (OUTPATIENT)
Dept: HOSPITAL 53 - M SFHCADAM | Age: 84
End: 2024-12-02
Attending: PHYSICIAN ASSISTANT
Payer: MEDICARE

## 2024-12-02 DIAGNOSIS — G12.21: Primary | ICD-10-CM

## 2024-12-02 LAB
ALBUMIN SERPL BCG-MCNC: 3.2 G/DL (ref 3.2–5.2)
ALP SERPL-CCNC: 116 U/L (ref 35–104)
ALT SERPL W P-5'-P-CCNC: 41 U/L (ref 7–40)
AST SERPL-CCNC: 30 U/L (ref ?–34)
BASOPHILS # BLD AUTO: 0.1 10^3/UL (ref 0–0.2)
BASOPHILS NFR BLD AUTO: 1 % (ref 0–1)
BILIRUB SERPL-MCNC: 0.7 MG/DL (ref 0.3–1.2)
BUN SERPL-MCNC: 16 MG/DL (ref 9–23)
CALCIUM SERPL-MCNC: 10.1 MG/DL (ref 8.3–10.6)
CHLORIDE SERPL-SCNC: 100 MMOL/L (ref 98–107)
CO2 SERPL-SCNC: 35 MMOL/L (ref 20–31)
CREAT SERPL-MCNC: 0.65 MG/DL (ref 0.55–1.3)
EOSINOPHIL # BLD AUTO: 0.6 10^3/UL (ref 0–0.5)
EOSINOPHIL NFR BLD AUTO: 5.8 % (ref 0–3)
GFR SERPL CREATININE-BSD FRML MDRD: > 60 ML/MIN/{1.73_M2} (ref 32–?)
GLUCOSE SERPL-MCNC: 124 MG/DL (ref 74–106)
HCT VFR BLD AUTO: 43.8 % (ref 36–47)
HGB BLD-MCNC: 14.5 G/DL (ref 12–15.5)
LYMPHOCYTES # BLD AUTO: 2.1 10^3/UL (ref 1.5–5)
LYMPHOCYTES NFR BLD AUTO: 21.3 % (ref 24–44)
MCH RBC QN AUTO: 31 PG (ref 27–33)
MCHC RBC AUTO-ENTMCNC: 33.1 G/DL (ref 32–36.5)
MCV RBC AUTO: 93.8 FL (ref 80–96)
MONOCYTES # BLD AUTO: 0.7 10^3/UL (ref 0–0.8)
MONOCYTES NFR BLD AUTO: 7.4 % (ref 2–8)
NEUTROPHILS # BLD AUTO: 6.3 10^3/UL (ref 1.5–8.5)
NEUTROPHILS NFR BLD AUTO: 63.6 % (ref 36–66)
PLATELET # BLD AUTO: 301 10^3/UL (ref 150–450)
POTASSIUM SERPL-SCNC: 4.2 MMOL/L (ref 3.5–5.1)
PROT SERPL-MCNC: 6.4 G/DL (ref 5.7–8.2)
RBC # BLD AUTO: 4.67 10^6/UL (ref 4–5.4)
SODIUM SERPL-SCNC: 139 MMOL/L (ref 136–145)
WBC # BLD AUTO: 9.9 10^3/UL (ref 4–10)